# Patient Record
Sex: MALE | Race: WHITE | Employment: OTHER | URBAN - METROPOLITAN AREA
[De-identification: names, ages, dates, MRNs, and addresses within clinical notes are randomized per-mention and may not be internally consistent; named-entity substitution may affect disease eponyms.]

---

## 2020-07-03 ENCOUNTER — ANCILLARY PROCEDURE (OUTPATIENT)
Dept: GENERAL RADIOLOGY | Facility: CLINIC | Age: 70
End: 2020-07-03
Attending: PHYSICAL MEDICINE & REHABILITATION
Payer: COMMERCIAL

## 2020-07-03 ENCOUNTER — OFFICE VISIT (OUTPATIENT)
Dept: ORTHOPEDICS | Facility: CLINIC | Age: 70
End: 2020-07-03
Payer: COMMERCIAL

## 2020-07-03 VITALS — DIASTOLIC BLOOD PRESSURE: 77 MMHG | SYSTOLIC BLOOD PRESSURE: 154 MMHG | HEART RATE: 79 BPM

## 2020-07-03 DIAGNOSIS — M25.512 ACUTE PAIN OF LEFT SHOULDER: ICD-10-CM

## 2020-07-03 DIAGNOSIS — M25.512 ACUTE PAIN OF LEFT SHOULDER: Primary | ICD-10-CM

## 2020-07-03 PROCEDURE — 99204 OFFICE O/P NEW MOD 45 MIN: CPT | Performed by: PHYSICAL MEDICINE & REHABILITATION

## 2020-07-03 PROCEDURE — 73030 X-RAY EXAM OF SHOULDER: CPT | Mod: LT

## 2020-07-03 NOTE — PROGRESS NOTES
Sports Medicine Clinic Visit    PCP: No Ref-Primary, Physician    CC: Patient presents with:  Left Shoulder - Pain      HPI:  Justin Forrester is a 70 year old male who is seen as a self referral.   He notes left shoulder pain that began ~3 weeks ago when he fell with his arm behind him.   He rates the pain at a  5/10 at its worst and a 0/10 currently.  Symptoms are relieved with not using the arm. Symptoms are worsened by overhead motions.  He endorses popping.   He denies numbness, tingling and weakness.  Other treatment has included cold compresses. He notes difficulty with lifting his arm past shoulder height.       Review of Systems:  Musculoskeletal: as above  Remainder of review of systems is negative including constitutional, eyes, ENT, CV, pulmonary, GI, , endocrine, skin, hematologic, and neurologic except as noted in HPI or medical history.    History reviewed. No pertinent past surgical/medical/family/social history other than as mentioned in HPI.    There is no problem list on file for this patient.    Past Medical History:   Diagnosis Date     Blood clotting disorder (H)      Osteoporosis      Urinary leakage     after prostate cancer     No past surgical history on file.  No family history on file.  Social History     Socioeconomic History     Marital status:      Spouse name: Not on file     Number of children: Not on file     Years of education: Not on file     Highest education level: Not on file   Occupational History     Not on file   Social Needs     Financial resource strain: Not on file     Food insecurity     Worry: Not on file     Inability: Not on file     Transportation needs     Medical: Not on file     Non-medical: Not on file   Tobacco Use     Smoking status: Never Smoker     Smokeless tobacco: Never Used   Substance and Sexual Activity     Alcohol use: Not on file     Drug use: Not on file     Sexual activity: Not on file   Lifestyle     Physical activity     Days per week: Not  on file     Minutes per session: Not on file     Stress: Not on file   Relationships     Social connections     Talks on phone: Not on file     Gets together: Not on file     Attends [employer] service: Not on file     Active member of club or organization: Not on file     Attends meetings of clubs or organizations: Not on file     Relationship status: Not on file     Intimate partner violence     Fear of current or ex partner: Not on file     Emotionally abused: Not on file     Physically abused: Not on file     Forced sexual activity: Not on file   Other Topics Concern     Not on file   Social History Narrative     Not on file       He is living with his daughter as he lives in [place] and came to the [place] to help her move.  Because of COVID-19, he has not been able to return home.        No current outpatient medications on file.     No current facility-administered medications for this visit.      Allergies no known allergies      Objective:  BP (!) 154/77   Pulse 79     General: Alert and in no distress    Head: Normocephalic, atraumatic  Eyes: no scleral icterus or conjunctival erythema   Skin: no erythema, petechiae, or jaundice  CV: regular rhythm by palpation, 2+ distal pulses  Resp: normal respiratory effort without conversational dyspnea   Psych: normal mood and affect    Gait: Non-antalgic, appropriate coordination and balance   Neuro: Motor strength and sensation as noted below    Musculoskeletal:  -No tenderness to palpation over the bilateral shoulders.  -Left active shoulder abduction and flexion 45 degrees and painful.  -Left shoulder adduction, internal rotation behind the back, and external rotation decreased and painful.  - strength 5/5 bilaterally  -Sensation intact to light touch over the bilateral upper extremities    Radiology:  X-rays ordered and independent visualization of images performed and reviewed with Emile and his daughter.    Recent Results (from the past 744 hour(s))   XR Shoulder  Left G/E 3 Views    Narrative    XR SHOULDER LT G/E 3 VW  7/3/2020 2:25 PM     HISTORY: Acute pain of left shoulder    COMPARISON: None      Impression    IMPRESSION: No acute fracture is identified. There is normal joint  spacing and alignment. Mild glenohumeral and acromioclavicular joint  degenerative changes. Focal radiodensity anterior to the humeral head  may suggest calcific tendinopathy. Small subacromial enthesophyte.  Osteopenia.     SHERRI DUNCAN MD       Assessment:  1. Acute pain of left shoulder        Plan:  Discussed the assessment with the patient and developed a plan together:  -Concern for rotator cuff tear.  MRI of the left shoulder ordered.  [facility] [phone]. Cost estimates can be provided by [facility] at the same number.  -Ice or heat 15-20 minutes as needed (Avoid sleeping on a heating pad or ice)  -Patient's preferred over the counter medications as directed on packaging as needed for pain or soreness.    -Avoid aggravating activities.    -Follow up in clinic after completion of MRI. Please schedule at least 1-2 business days after MRI is completed to ensure we have the results of the MRI.    -Justin to follow up with primary care provider regarding elevated blood pressure.      Adina Farmer MD, CAQ Sports Medicine  [facility]

## 2020-07-03 NOTE — PATIENT INSTRUCTIONS
-MRI of the left shoulder ordered.  [facility] [phone].  Cost estimates can be provided by [facility] at the same number.  -Ice or heat 15-20 minutes as needed (Avoid sleeping on a heating pad or ice)  -Patient's preferred over the counter medications as directed on packaging as needed for pain or soreness.    -Avoid aggravating activities.    -Follow up in clinic after completion of MRI. Please schedule at least 1-2 business days after MRI is completed to ensure we have the results of the MRI.    -Justin to follow up with primary care provider regarding elevated blood pressure.

## 2020-07-03 NOTE — LETTER
7/3/2020         RE: Justin Forrester  [address] [facility] [address]        Dear Colleague,    Thank you for referring your patient, Justin Forrester, to the [facility] [place]. Please see a copy of my visit note below.    Sports Medicine Clinic Visit    PCP: No Ref-Primary, Physician    CC: Patient presents with:  Left Shoulder - Pain      HPI:  Justin Forrester is a 70 year old male who is seen as a self referral.   He notes left shoulder pain that began ~3 weeks ago when he fell with his arm behind him.   He rates the pain at a  5/10 at its worst and a 0/10 currently.  Symptoms are relieved with not using the arm. Symptoms are worsened by overhead motions.  He endorses popping.   He denies numbness, tingling and weakness.  Other treatment has included cold compresses. He notes difficulty with lifting his arm past shoulder height.       Review of Systems:  Musculoskeletal: as above  Remainder of review of systems is negative including constitutional, eyes, ENT, CV, pulmonary, GI, , endocrine, skin, hematologic, and neurologic except as noted in HPI or medical history.    History reviewed. No pertinent past surgical/medical/family/social history other than as mentioned in HPI.    There is no problem list on file for this patient.    Past Medical History:   Diagnosis Date     Blood clotting disorder (H)      Osteoporosis      Urinary leakage     after prostate cancer     No past surgical history on file.  No family history on file.  Social History     Socioeconomic History     Marital status:      Spouse name: Not on file     Number of children: Not on file     Years of education: Not on file     Highest education level: Not on file   Occupational History     Not on file   Social Needs     Financial resource strain: Not on file     Food insecurity     Worry: Not on file     Inability: Not on file     Transportation needs     Medical: Not on file     Non-medical: Not on file    Tobacco Use     Smoking status: Never Smoker     Smokeless tobacco: Never Used   Substance and Sexual Activity     Alcohol use: Not on file     Drug use: Not on file     Sexual activity: Not on file   Lifestyle     Physical activity     Days per week: Not on file     Minutes per session: Not on file     Stress: Not on file   Relationships     Social connections     Talks on phone: Not on file     Gets together: Not on file     Attends [employer] service: Not on file     Active member of club or organization: Not on file     Attends meetings of clubs or organizations: Not on file     Relationship status: Not on file     Intimate partner violence     Fear of current or ex partner: Not on file     Emotionally abused: Not on file     Physically abused: Not on file     Forced sexual activity: Not on file   Other Topics Concern     Not on file   Social History Narrative     Not on file       He is living with his daughter as he lives in [place] and came to the [place] to help her move.  Because of COVID-19, he has not been able to return home.        No current outpatient medications on file.     No current facility-administered medications for this visit.      Allergies no known allergies      Objective:  BP (!) 154/77   Pulse 79     General: Alert and in no distress    Head: Normocephalic, atraumatic  Eyes: no scleral icterus or conjunctival erythema   Skin: no erythema, petechiae, or jaundice  CV: regular rhythm by palpation, 2+ distal pulses  Resp: normal respiratory effort without conversational dyspnea   Psych: normal mood and affect    Gait: Non-antalgic, appropriate coordination and balance   Neuro: Motor strength and sensation as noted below    Musculoskeletal:  -No tenderness to palpation over the bilateral shoulders.  -Left active shoulder abduction and flexion 45 degrees and painful.  -Left shoulder adduction, internal rotation behind the back, and external rotation decreased and painful.  - strength 5/5  bilaterally  -Sensation intact to light touch over the bilateral upper extremities    Radiology:  X-rays ordered and independent visualization of images performed and reviewed with Emile and his daughter.    Recent Results (from the past 744 hour(s))   XR Shoulder Left G/E 3 Views    Narrative    XR SHOULDER LT G/E 3 VW  7/3/2020 2:25 PM     HISTORY: Acute pain of left shoulder    COMPARISON: None      Impression    IMPRESSION: No acute fracture is identified. There is normal joint  spacing and alignment. Mild glenohumeral and acromioclavicular joint  degenerative changes. Focal radiodensity anterior to the humeral head  may suggest calcific tendinopathy. Small subacromial enthesophyte.  Osteopenia.     SHERRI DUNCAN MD       Assessment:  1. Acute pain of left shoulder        Plan:  Discussed the assessment with the patient and developed a plan together:  -Concern for rotator cuff tear.  MRI of the left shoulder ordered.  [facility] [phone]. Cost estimates can be provided by [facility] at the same number.  -Ice or heat 15-20 minutes as needed (Avoid sleeping on a heating pad or ice)  -Patient's preferred over the counter medications as directed on packaging as needed for pain or soreness.    -Avoid aggravating activities.    -Follow up in clinic after completion of MRI. Please schedule at least 1-2 business days after MRI is completed to ensure we have the results of the MRI.    -Justin to follow up with primary care provider regarding elevated blood pressure.      Adina Farmer MD, [facility] Sports Medicine  [facility]    Again, thank you for allowing me to participate in the care of your patient.        Sincerely,        Erika Farmer MD

## 2020-07-03 NOTE — PROGRESS NOTES
Form completed and faxed to [facility] also sent to [facility] for urgent scanning. Confirmed sent by [employer] on 7/3/2020 at 5:02 ([id number]).     Tete Hyde M.Ed., LAT, ATC  Clinic Coordinator for Dr. Adina Farmer

## 2020-07-27 ENCOUNTER — TELEPHONE (OUTPATIENT)
Dept: ORTHOPEDICS | Facility: CLINIC | Age: 70
End: 2020-07-27

## 2020-07-27 NOTE — TELEPHONE ENCOUNTER
Daughter calling requesting a call back.     She notes that the insurance company is requesting medical records and has not received them yet. PALOMO on file for insurance company in the media tab. His daughter would like to fill out an PALOMO for herself so that she has a copy of the records.     Copy of PALOMO emailed to the patient's daughter. Will await completed PALOMO.     Tete Hyde M.Ed., LAT, ATC  Clinic Coordinator for Dr. Adina Farmer